# Patient Record
Sex: MALE | Race: WHITE
[De-identification: names, ages, dates, MRNs, and addresses within clinical notes are randomized per-mention and may not be internally consistent; named-entity substitution may affect disease eponyms.]

---

## 2018-06-14 ENCOUNTER — HOSPITAL ENCOUNTER (OUTPATIENT)
Dept: HOSPITAL 62 - RAD | Age: 63
End: 2018-06-14
Attending: PHYSICIAN ASSISTANT
Payer: COMMERCIAL

## 2018-06-14 DIAGNOSIS — M51.36: ICD-10-CM

## 2018-06-14 DIAGNOSIS — M51.34: Primary | ICD-10-CM

## 2018-06-14 PROCEDURE — 72148 MRI LUMBAR SPINE W/O DYE: CPT

## 2018-06-14 PROCEDURE — 72146 MRI CHEST SPINE W/O DYE: CPT

## 2018-06-14 NOTE — RADIOLOGY REPORT (SQ)
EXAM DESCRIPTION:  MRI LUMBAR SPINE WITHOUT



COMPLETED DATE/TIME:  6/14/2018 2:04 pm



REASON FOR STUDY:  M51.34 OTHER INTERVERTEBRAL DISC DEGENER M51.34  OTHER INTERVERTEBRAL DISC DEGENER
ATION, THORACIC PADMINI M51.36  OTHER INTERVERTEBRAL DISC DEGENERATION, LUMBAR REGION



COMPARISON:  CT chest abdomen pelvis 7/21/2014



TECHNIQUE:  Sagittal and Axial imaging includes T1, T2, STIR and gradient echo sequences. Coronal T2/
HASTE imaging.



LIMITATIONS:  Patient was in pain, could not lay supine.  Scanning was performed with the patient moris alas on his left side in the MR scanner with no significant compromise of image quality.



FINDINGS:  VISUALIZED UPPER ABDOMEN: Small right kidney, hypertrophied left kidney unchanged from 201
4.  No hydronephrosis.

SEGMENTATION: No transitional anatomy. The lowest well-developed disc space is labeled L5-S1.

ALIGNMENT: Anatomic.

VERTEBRAE: Intact.

BONE MARROW: Normal. No marrow replacement or reactive changes.  Benign hemangioma posterior L5 verte
bral body, posterior L2 vertebral body.

DISC SIGNAL: Decreased T2 weighted intervertebral disc signal with disc space loss of height at T11-1
2 and L5-S1.

POSTERIOR ELEMENTS:  Generally intact.  No pars defect evident.

HARDWARE: None in the spine.

CORD AND CONUS: Normal in size and signal intensity. Conus at the T12-L1 level.

SOFT TISSUES: No aortic aneurysm seen. No bulky retroperitoneal adenopathy or mass. No paraspinal mas
s or fluid.

T11-12:  Mild posterior disc bulging without significant central or foraminal encroachment.

T12-L1:  Unremarkable

L1-L2: Mild bilateral facet arthropathy without significant central or foraminal encroachment.

L2-L3: Mild bilateral facet arthropathy without significant central or foraminal encroachment.

L3-L4: Very mild diffuse posterior disc bulge and moderate bilateral facet and ligament hypertrophy i
s present.  No central stenosis.  Mild bilateral inferior foraminal narrowing without exiting L3 nerv
e root impingement.

L4-L5: Very mild diffuse posterior disc bulging.  Moderate bilateral facet and ligament hypertrophy. 
 No central stenosis.  Mild bilateral inferior foraminal narrowing without exit L4 nerve root impinge
ment.

L5-S1: Minimal posterior disc bulging is present.  Mild bilateral facet and ligament hypertrophy.  No
 significant central canal narrowing.  Mild bilateral foraminal narrowing without exiting L5 nerve ro
ot impingement.

SACRUM: Visualized upper sacrum intact.

OTHER: No other significant findings.



IMPRESSION:  Diffuse facet arthropathy lumbar spine.  No high-grade central or foraminal encroachment
.



TECHNICAL DOCUMENTATION:  JOB ID:  [id number]

 2011 [employer]- All Rights Reserved



Reading location - IP/workstation name: [facility]

## 2018-06-14 NOTE — RADIOLOGY REPORT (SQ)
EXAM DESCRIPTION:  MRI THORACIC SPINE WITHOUT



COMPLETED DATE/TIME:  6/14/2018 2:04 pm



REASON FOR STUDY:  M51.34 OTHER INTERVERTEBRAL DISC DEGENER M51.34  OTHER INTERVERTEBRAL DISC DEGENER
ATION, THORACIC PADMINI M51.36  OTHER INTERVERTEBRAL DISC DEGENERATION, LUMBAR REGION



COMPARISON:  CT chest 7/21/2014



TECHNIQUE:  Sagittal and Axial imaging includes T1, T2, STIR and gradient echo sequences.



LIMITATIONS:  Patient was scanned in the left decubitus position, a diffuse unable to lay flat on his
 back in the MR scanner G-tube back pain.  There is mild degradation of signal to noise.  Overall an
gnostic quality of the study is good.

Please note that the T1-2 and T2-3 disc levels are incompletely included in the field of view.



FINDINGS:  LOCALIZER: No worrisome findings.

ALIGNMENT: Normal.

VERTEBRAE: Intact.  No compression deformities.  No vertebral body marrow edema.

BONE MARROW: Fatty reactive vertebral body endplate changes are present at T11-12.

HARDWARE: None in the spine.

CORD: Normal in size and signal intensity.

SOFT TISSUES: No soft tissue masses.

THORACIC DISCS T1-T12: T1-2 and T2-3 are not included in the field of view due to patient positioning
.

T3-4, T4-5, T5-6, T6-7 and T7-8 are unremarkable aside from mild multilevel facet arthropathy.

At T8-9 and T9-10, right and left paracentral disc bulging and bony spurring is present with mild mayuri
ateral facet hypertrophy.  No central stenosis.  Mild bilateral foraminal narrowing.

At T11-12, a small left paracentral disc protrusion is present partially effacing the ventral thecal 
sac and abutting the distal thoracic cord without significant cord impingement or abnormal intrinsic 
cord signal.  This is best shown on axial series 9, image 20, and sagittal T2 image 10.  No significa
nt foraminal narrowing at T11-12.  Mild bilateral facet hypertrophy.

T12-L1 is unremarkable.

OTHER: No other significant finding.



IMPRESSION:  Mild diffuse degenerative changes as above.  No high-grade central or foraminal encroach
ment.  No MR evidence of thoracic spine compression deformity



TECHNICAL DOCUMENTATION:  JOB ID:  [id number]

 2011 [employer]- All Rights Reserved



Reading location - IP/workstation name: [facility]